# Patient Record
Sex: MALE | Race: BLACK OR AFRICAN AMERICAN | NOT HISPANIC OR LATINO | URBAN - METROPOLITAN AREA
[De-identification: names, ages, dates, MRNs, and addresses within clinical notes are randomized per-mention and may not be internally consistent; named-entity substitution may affect disease eponyms.]

---
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
## 2019-04-08 ENCOUNTER — OUTPATIENT (OUTPATIENT)
Dept: OUTPATIENT SERVICES | Facility: HOSPITAL | Age: 19
LOS: 1 days | Discharge: HOME | End: 2019-04-08

## 2019-04-08 DIAGNOSIS — Z00.01 ENCOUNTER FOR GENERAL ADULT MEDICAL EXAMINATION WITH ABNORMAL FINDINGS: ICD-10-CM

## 2022-05-27 ENCOUNTER — EMERGENCY (EMERGENCY)
Facility: HOSPITAL | Age: 22
LOS: 0 days | Discharge: HOME | End: 2022-05-27
Attending: EMERGENCY MEDICINE | Admitting: EMERGENCY MEDICINE
Payer: MEDICAID

## 2022-05-27 VITALS
SYSTOLIC BLOOD PRESSURE: 125 MMHG | TEMPERATURE: 97 F | HEART RATE: 80 BPM | RESPIRATION RATE: 20 BRPM | DIASTOLIC BLOOD PRESSURE: 62 MMHG | OXYGEN SATURATION: 100 %

## 2022-05-27 DIAGNOSIS — K02.9 DENTAL CARIES, UNSPECIFIED: ICD-10-CM

## 2022-05-27 DIAGNOSIS — K08.89 OTHER SPECIFIED DISORDERS OF TEETH AND SUPPORTING STRUCTURES: ICD-10-CM

## 2022-05-27 PROCEDURE — 99282 EMERGENCY DEPT VISIT SF MDM: CPT

## 2022-05-27 NOTE — ED ADULT NURSE NOTE - NSIMPLEMENTINTERV_GEN_ALL_ED
Implemented All Universal Safety Interventions:  [place] to call system. Call bell, personal items and telephone within reach. Instruct patient to call for assistance. Room bathroom lighting operational. Non-slip footwear when patient is off stretcher. Physically safe environment: no spills, clutter or unnecessary equipment. Stretcher in lowest position, wheels locked, appropriate side rails in place.

## 2022-05-27 NOTE — CONSULT NOTE ADULT - SUBJECTIVE AND OBJECTIVE BOX
Patient is a 21y old  Male who presents with a chief complaint of dental pain.    HPI: Patient states dental pain began in lower left quadrant last year. Patient has not been to a dentist since.      PAST MEDICAL & SURGICAL HISTORY:    ( -  ) heart valve replacement  ( -  ) joint replacement  ( -  ) pregnancy    MEDICATIONS  (STANDING): Denies    MEDICATIONS  (PRN): Denies      Allergies: Denies    FAMILY HISTORY:      *SOCIAL HISTORY: (  - ) Tobacco; ( -  ) ETOH    Vital Signs Last 24 Hrs  T(C): 36.1 (27 May 2022 15:00), Max: 36.1 (27 May 2022 15:00)  T(F): 96.9 (27 May 2022 15:00), Max: 96.9 (27 May 2022 15:00)  HR: 80 (27 May 2022 15:00) (80 - 80)  BP: 125/62 (27 May 2022 15:00) (125/62 - 125/62)  BP(mean): --  RR: 20 (27 May 2022 15:00) (20 - 20)  SpO2: 100% (27 May 2022 15:00) (100% - 100%)    EOE:  TMJ ( -  ) clicks                     ( -  ) pops                     ( -  ) crepitus             Mandible <<FROM>>             Facial bones and MOM <<grossly intact>>             ( -  ) trismus             ( -  ) lymphadenopathy             ( -  ) swelling             ( -  ) asymmetry             ( -  ) palpation             ( -  ) dyspnea             ( -  ) dysphagia             ( -  ) loss of consciousness    IOE:  <<permanent dentition: <<multiple carious teeth>>            hard/soft palate:  ( -  ) palatal torus, <<No pathology noted>>           tongue/FOM <<No pathology noted>>           labial/buccal mucosa <<No pathology noted>>           (  + ) percussion           (  + ) palpation           (  - ) swelling            (  - ) abscess           (-   ) sinus tract    *DENTAL RADIOGRAPHS: 2 Periapicals; #18, 19    *ASSESSMENT: Clinical exam performed. No sinus tract, swelling or lymphadenopathy. Teeth #18 and 19 display gross caries, loss of coronal tooth structure and pulpal involvement. Root caries #19 and periapical radiolucency. #19 deemed nonrestorable due to caries.      *PLAN: Extract #19 via local anesthesia    PROCEDURE:   Verbal and written consent given. Treatment consequences explained to patient as per OS sheet dated 07/13/00. Side/site verified. Consent obtained.   Anesthesia: 3 carpule of 2% Lidocaine (1:100,000 epinephrine) via left inferior alveolar nerve block (1 carpule) and local infiltration (2 carpules)  Treatment: Tooth #19 extracted. Irrigation with saline. Hemostasis obtained. Post-operative periapical taken and confirmed. Instructions given to patient both verbal and written. All questions answered and patient comfortable upon release.     RECOMMENDATIONS:  1) 500 mg Amoxicillin q8h x 7 days  2) 600 mg Ibuprofen q6h x 5 days  3) Dental F/U with outpatient dentist for comprehensive dental care.   4) If any difficulty swallowing/breathing, fever occur, return to ER.       Naye Hernández, PRAVIN   Pager #[phone] Patient is a 21y old  Male who presents with a chief complaint of dental pain.    HPI: Patient states dental pain began in lower left quadrant last year. Patient has not been to a dentist since.      PAST MEDICAL & SURGICAL HISTORY:    ( -  ) heart valve replacement  ( -  ) joint replacement  ( -  ) pregnancy    MEDICATIONS  (STANDING): Denies    MEDICATIONS  (PRN): Denies      Allergies: Denies    FAMILY HISTORY:      *SOCIAL HISTORY: (  - ) Tobacco; ( -  ) ETOH    Vital Signs Last 24 Hrs  T(C): 36.1 (27 May 2022 15:00), Max: 36.1 (27 May 2022 15:00)  T(F): 96.9 (27 May 2022 15:00), Max: 96.9 (27 May 2022 15:00)  HR: 80 (27 May 2022 15:00) (80 - 80)  BP: 125/62 (27 May 2022 15:00) (125/62 - 125/62)  BP(mean): --  RR: 20 (27 May 2022 15:00) (20 - 20)  SpO2: 100% (27 May 2022 15:00) (100% - 100%)    EOE:  TMJ ( -  ) clicks                     ( -  ) pops                     ( -  ) crepitus             Mandible <<FROM>>             Facial bones and MOM <<grossly intact>>             ( -  ) trismus             ( -  ) lymphadenopathy             ( -  ) swelling             ( -  ) asymmetry             ( -  ) palpation             ( -  ) dyspnea             ( -  ) dysphagia             ( -  ) loss of consciousness    IOE:  <<permanent dentition: <<multiple carious teeth>>            hard/soft palate:  ( -  ) palatal torus, <<No pathology noted>>           tongue/FOM <<No pathology noted>>           labial/buccal mucosa <<No pathology noted>>           (  + ) percussion           (  + ) palpation           (  - ) swelling            (  - ) abscess           (-   ) sinus tract    *DENTAL RADIOGRAPHS: 2 Periapicals; #18, 19    *ASSESSMENT: Clinical exam performed. No sinus tract, swelling or lymphadenopathy. Teeth #18 and 19 display gross caries, loss of coronal tooth structure and pulpal involvement. Root caries #19 and periapical radiolucency. #18 and #19 deemed nonrestorable due to caries.      *PLAN: Extract #19 via local anesthesia    PROCEDURE:   Verbal and written consent given. Treatment consequences explained to patient as per OS sheet dated 07/13/00. Side/site verified. Consent obtained.   Anesthesia: 3 carpule of 2% Lidocaine (1:100,000 epinephrine) via left inferior alveolar nerve block (1 carpule) and local infiltration (2 carpules)  Treatment: Tooth #18 and #19 extracted. Irrigation with saline. Hemostasis obtained. Post-operative periapical taken and confirmed. Instructions given to patient both verbal and written. All questions answered and patient comfortable upon release.     RECOMMENDATIONS:  1) 500 mg Amoxicillin q8h x 7 days  2) 600 mg Ibuprofen q6h x 5 days  3) Dental F/U with outpatient dentist for comprehensive dental care.   4) If any difficulty swallowing/breathing, fever occur, return to ER.       Naye Hernández, PRAVIN   Pager #[phone]

## 2022-05-27 NOTE — ED PROVIDER NOTE - PHYSICAL EXAMINATION
Constitutional: Well appearing. No acute distress. Non toxic.   Eyes: PERRLA. Extraocular movements intact, no entrapment. Conjunctiva normal.   ENT: No nasal discharge. Moist mucus membranes.  Airway intact, no drooling, no stridor, no pooling of secretions, no posterior oropharyngeal erythema/edema/lesions. Speaking in full sentences. +tolerating secretions, tolerating PO +dental carry  Neck: Supple, non tender, full range of motion.  CV: RRR no murmurs, rubs, or gallops. +S1S2.   Pulm:  Normal work of breathing.  Abd: soft NT ND +BS.      Psy: Cooperative, appropriate.   Skin: Warm, dry, no rash

## 2023-03-02 ENCOUNTER — EMERGENCY (EMERGENCY)
Facility: HOSPITAL | Age: 23
LOS: 0 days | Discharge: HOME | End: 2023-03-02
Attending: EMERGENCY MEDICINE
Payer: MEDICAID

## 2023-03-02 VITALS
DIASTOLIC BLOOD PRESSURE: 72 MMHG | OXYGEN SATURATION: 98 % | HEART RATE: 72 BPM | SYSTOLIC BLOOD PRESSURE: 128 MMHG | RESPIRATION RATE: 18 BRPM | TEMPERATURE: 98 F
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
## 2023-03-02 DIAGNOSIS — S09.90XA UNSPECIFIED INJURY OF HEAD, INITIAL ENCOUNTER: ICD-10-CM
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
## 2023-03-02 DIAGNOSIS — S00.12XA CONTUSION OF LEFT EYELID AND PERIOCULAR AREA, INITIAL ENCOUNTER: ICD-10-CM

## 2023-03-02 DIAGNOSIS — R51.9 HEADACHE, UNSPECIFIED: ICD-10-CM

## 2023-03-02 DIAGNOSIS — S00.11XA CONTUSION OF RIGHT EYELID AND PERIOCULAR AREA, INITIAL ENCOUNTER: ICD-10-CM

## 2023-03-02 PROCEDURE — 99284 EMERGENCY DEPT VISIT MOD MDM: CPT

## 2023-03-02 PROCEDURE — 70450 CT HEAD/BRAIN W/O DYE: CPT | Mod: 26,MA

## 2023-03-02 PROCEDURE — 70450 CT HEAD/BRAIN W/O DYE: CPT | Mod: MA

## 2023-03-02 PROCEDURE — 99284 EMERGENCY DEPT VISIT MOD MDM: CPT | Mod: 25

## 2023-03-02 PROCEDURE — 70486 CT MAXILLOFACIAL W/O DYE: CPT | Mod: 26,MA

## 2023-03-02 PROCEDURE — 70486 CT MAXILLOFACIAL W/O DYE: CPT | Mod: MA

## 2023-03-02 RX ORDER — ACETAMINOPHEN 500 MG
975 TABLET ORAL ONCE
Refills: 0 | Status: COMPLETED | OUTPATIENT
Start: 2023-03-02 | End: 2023-03-02

## 2023-03-02 RX ADMIN — Medication 975 MILLIGRAM(S): at 14:09

## 2023-03-02 NOTE — ED PROVIDER NOTE - NSFOLLOWUPCLINICS_GEN_ALL_ED_FT
[facility] Concussion Program  Concussion Program  [address] [place] [address]   Phone: [phone]  Fax:   Follow Up Time: 1-3 Days
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
## 2023-03-02 NOTE — ED PROVIDER NOTE - PATIENT PORTAL LINK FT
You can access the FollowMyHealth Patient Portal offered by [facility] by registering at the following website: [email or web address]. By joining [facility]’s FollowMyHealth portal, you will also be able to view your health information using other applications (apps) compatible with our system.

## 2023-03-02 NOTE — ED PROVIDER NOTE - PHYSICAL EXAMINATION
Gen: NAD, AOx3  Head: NCAT  HEENT: PERRL, TTP bilateral orbits R>L w/ mild ecchymosis, oral mucosa moist, normal conjunctiva, oropharynx clear without exudate or erythema  Lung: CTAB, no respiratory distress, no wheezing, rales, rhonchi  CV: normal s1/s2, rrr, Normal perfusion, pulses 2+ throughout  Abd: soft, NTND, no CVA tenderness  Genitourinary: no pelvic tenderness  MSK: No edema, no visible deformities, full range of motion in all 4 extremities  Neuro: CN II-XII grossly intact, No focal neurologic deficits, no meningeal signs, no nystagmus/pronator drift, coordination/sensation intact, strength 5/5 BUE/BLE, steady gait   Skin: No rash   Psych: normal affect
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
## 2023-03-02 NOTE — ED PROVIDER NOTE - NSFOLLOWUPINSTRUCTIONS_ED_ALL_ED_FT
Please follow up with your primary care physician within 24-72 hours and return immediately if symptoms worsen.      General Assault    Assault includes any behavior or physical attack—whether it is on purpose or not—that results in injury to another person, damage to property, or both. This also includes assault that has not yet happened, but is planned to happen, as well as threats that cause fear of assault. Threats of assault may be physical, verbal, or written. They may be spoken or sent by:  •Mail.      •E-mail.      •Text.      •Social media.      •Fax.      The threats may be direct, implied, or understood.      What are the different forms of assault?  Forms of assault include:•Physically assaulting a person. This includes physical threats to inflict physical harm as well as:  •Slapping.      •Hitting.      •Poking.      •Kicking.      •Punching.      •Pushing.        •Sexually assaulting a person. Sexual assault is any sexual activity that a person is forced, threatened, or coerced to participate in. It may or may not involve physical contact with the person who is assaulting you. You are sexually assaulted if you are forced to have sexual contact of any kind.      •Damaging or destroying a person's assistive equipment, such as glasses, canes, or walkers.      •Throwing or hitting objects.      •Using or displaying a weapon to harm or threaten someone. Examples of weapons may include guns, knives, sticks, or bats.      •Using or displaying an object that appears to be a weapon in a threatening manner.      •Using greater physical size or strength to intimidate someone.      •Making intimidating or threatening gestures.      •Bullying.      •Hazing.      •Using language that is intimidating, threatening, hostile, or abusive.      •Stalking.      •Restraining someone with force.        What can I do if I experience assault?     •Report assaults, threats, and stalking to the police. Call 911 if you are in immediate danger or you need medical help.    •Work with a  or an advocate to get legal protection against someone who has assaulted you or threatened you with assault. Protection includes:  •Getting a court order asking the person to stay away from you (restraining order).      •Moving you to a private address.      •Prosecuting the person through the courts. Laws vary depending on where you live.          Follow these instructions at home:    •Avoid areas where you feel unsafe.      •Try to stay in areas that are around other people.      •Consider learning methods of protection from assault, such as self-defense.        Where to find support  If you have experienced assault, you may seek help from:  •A professional counselor, family member, clergy, or a trusted friend to talk about what happened.      •The National Center for Victims of Crime: www.victimsofcrime.org. This is an advocacy center that provides information for people who have been assaulted or subjected to violence.        Summary    •An assault is any behavior or physical attack that results in injury to another person, damage to property, or both.      •An assault includes threats that cause a person to fear for his or her safety. Threats may be communicated via spoken word, mail, e-mail, text messages, or social media.      •There are many forms of assault. They include physical assault, sexual assault, damaging a person's property, displaying a weapon, stalking another person, or restraining someone with force.      •Report assaults, threats, and stalking to the police. Call 911 if you are in immediate danger or you need medical help.      •Prevent assault by being aware of your surroundings, avoiding areas where you feel unsafe, and talking to a  about getting legal protection against someone who has assaulted you or threatened you with assault.      This information is not intended to replace advice given to you by your health care provider. Make sure you discuss any questions you have with your health care provider.
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
## 2023-09-14 NOTE — ED PROVIDER NOTE - IV ALTEPLASE EXCL REL HIDDEN
Urology resident  paged regarding patient inability to void. Pt will be given 30 more minutes to attempt to void, then a mares catheter will be placed, and patient will be discharged with urology clinic follow up appointment.   show